# Patient Record
Sex: MALE | Race: BLACK OR AFRICAN AMERICAN | NOT HISPANIC OR LATINO | Employment: UNEMPLOYED | URBAN - METROPOLITAN AREA
[De-identification: names, ages, dates, MRNs, and addresses within clinical notes are randomized per-mention and may not be internally consistent; named-entity substitution may affect disease eponyms.]

---

## 2021-06-02 ENCOUNTER — RECORDS - HEALTHEAST (OUTPATIENT)
Dept: ADMINISTRATIVE | Facility: CLINIC | Age: 31
End: 2021-06-02

## 2023-01-17 ENCOUNTER — HOSPITAL ENCOUNTER (EMERGENCY)
Facility: CLINIC | Age: 33
Discharge: HOME | End: 2023-01-17
Attending: EMERGENCY MEDICINE | Admitting: EMERGENCY MEDICINE
Payer: COMMERCIAL

## 2023-01-17 ENCOUNTER — HOSPITAL ENCOUNTER (EMERGENCY)
Facility: HOSPITAL | Age: 33
Discharge: HOME | End: 2023-01-17
Attending: EMERGENCY MEDICINE
Payer: COMMERCIAL

## 2023-01-17 VITALS
TEMPERATURE: 98.8 F | SYSTOLIC BLOOD PRESSURE: 144 MMHG | BODY MASS INDEX: 22.49 KG/M2 | OXYGEN SATURATION: 99 % | WEIGHT: 135 LBS | RESPIRATION RATE: 18 BRPM | DIASTOLIC BLOOD PRESSURE: 99 MMHG | HEIGHT: 65 IN | HEART RATE: 88 BPM

## 2023-01-17 VITALS
HEART RATE: 73 BPM | HEIGHT: 65 IN | RESPIRATION RATE: 18 BRPM | DIASTOLIC BLOOD PRESSURE: 97 MMHG | WEIGHT: 134.6 LBS | BODY MASS INDEX: 22.42 KG/M2 | TEMPERATURE: 99.7 F | OXYGEN SATURATION: 100 % | SYSTOLIC BLOOD PRESSURE: 150 MMHG

## 2023-01-17 DIAGNOSIS — F12.90 CANNABINOID HYPEREMESIS SYNDROME: ICD-10-CM

## 2023-01-17 DIAGNOSIS — D72.829 LEUKOCYTOSIS, UNSPECIFIED TYPE: ICD-10-CM

## 2023-01-17 DIAGNOSIS — R11.2 CANNABINOID HYPEREMESIS SYNDROME: ICD-10-CM

## 2023-01-17 PROBLEM — R11.15 CYCLIC VOMITING SYNDROME: Status: ACTIVE | Noted: 2023-01-17

## 2023-01-17 LAB
ALBUMIN SERPL-MCNC: 4.4 G/DL (ref 3.5–5)
ALP SERPL-CCNC: 57 U/L (ref 45–120)
ALT SERPL W P-5'-P-CCNC: 27 U/L (ref 0–45)
ANION GAP SERPL CALCULATED.3IONS-SCNC: 14 MMOL/L (ref 5–18)
AST SERPL W P-5'-P-CCNC: 32 U/L (ref 0–40)
BASOPHILS # BLD AUTO: 0 10E3/UL (ref 0–0.2)
BASOPHILS NFR BLD AUTO: 0 %
BILIRUB SERPL-MCNC: 3.2 MG/DL (ref 0–1)
BUN SERPL-MCNC: 20 MG/DL (ref 8–22)
C REACTIVE PROTEIN LHE: 0.2 MG/DL (ref 0–?)
CALCIUM SERPL-MCNC: 9.8 MG/DL (ref 8.5–10.5)
CHLORIDE BLD-SCNC: 108 MMOL/L (ref 98–107)
CO2 SERPL-SCNC: 22 MMOL/L (ref 22–31)
CREAT SERPL-MCNC: 1.15 MG/DL (ref 0.7–1.3)
EOSINOPHIL # BLD AUTO: 0 10E3/UL (ref 0–0.7)
EOSINOPHIL NFR BLD AUTO: 0 %
ERYTHROCYTE [DISTWIDTH] IN BLOOD BY AUTOMATED COUNT: 12.7 % (ref 10–15)
GFR SERPL CREATININE-BSD FRML MDRD: 86 ML/MIN/1.73M2
GLUCOSE BLD-MCNC: 118 MG/DL (ref 70–125)
HCT VFR BLD AUTO: 46.4 % (ref 40–53)
HGB BLD-MCNC: 16.6 G/DL (ref 13.3–17.7)
IMM GRANULOCYTES # BLD: 0.1 10E3/UL
IMM GRANULOCYTES NFR BLD: 1 %
LIPASE SERPL-CCNC: <9 U/L (ref 0–52)
LYMPHOCYTES # BLD AUTO: 2.6 10E3/UL (ref 0.8–5.3)
LYMPHOCYTES NFR BLD AUTO: 20 %
MAGNESIUM SERPL-MCNC: 2.2 MG/DL (ref 1.8–2.6)
MCH RBC QN AUTO: 31.5 PG (ref 26.5–33)
MCHC RBC AUTO-ENTMCNC: 35.8 G/DL (ref 31.5–36.5)
MCV RBC AUTO: 88 FL (ref 78–100)
MONOCYTES # BLD AUTO: 1.8 10E3/UL (ref 0–1.3)
MONOCYTES NFR BLD AUTO: 14 %
NEUTROPHILS # BLD AUTO: 8.2 10E3/UL (ref 1.6–8.3)
NEUTROPHILS NFR BLD AUTO: 65 %
NRBC # BLD AUTO: 0 10E3/UL
NRBC BLD AUTO-RTO: 0 /100
PLATELET # BLD AUTO: 200 10E3/UL (ref 150–450)
POTASSIUM BLD-SCNC: 3.5 MMOL/L (ref 3.5–5)
PROT SERPL-MCNC: 7.6 G/DL (ref 6–8)
RBC # BLD AUTO: 5.27 10E6/UL (ref 4.4–5.9)
SODIUM SERPL-SCNC: 144 MMOL/L (ref 136–145)
WBC # BLD AUTO: 12.7 10E3/UL (ref 4–11)

## 2023-01-17 PROCEDURE — 36415 COLL VENOUS BLD VENIPUNCTURE: CPT | Performed by: EMERGENCY MEDICINE

## 2023-01-17 PROCEDURE — 83735 ASSAY OF MAGNESIUM: CPT | Performed by: EMERGENCY MEDICINE

## 2023-01-17 PROCEDURE — 250N000009 HC RX 250: Performed by: EMERGENCY MEDICINE

## 2023-01-17 PROCEDURE — 83690 ASSAY OF LIPASE: CPT | Performed by: EMERGENCY MEDICINE

## 2023-01-17 PROCEDURE — 86140 C-REACTIVE PROTEIN: CPT | Performed by: EMERGENCY MEDICINE

## 2023-01-17 PROCEDURE — 85025 COMPLETE CBC W/AUTO DIFF WBC: CPT | Performed by: EMERGENCY MEDICINE

## 2023-01-17 PROCEDURE — 96360 HYDRATION IV INFUSION INIT: CPT

## 2023-01-17 PROCEDURE — 250N000011 HC RX IP 250 OP 636: Performed by: EMERGENCY MEDICINE

## 2023-01-17 PROCEDURE — 99283 EMERGENCY DEPT VISIT LOW MDM: CPT | Mod: 25

## 2023-01-17 PROCEDURE — 250N000013 HC RX MED GY IP 250 OP 250 PS 637: Performed by: EMERGENCY MEDICINE

## 2023-01-17 PROCEDURE — 80053 COMPREHEN METABOLIC PANEL: CPT | Performed by: EMERGENCY MEDICINE

## 2023-01-17 PROCEDURE — 258N000003 HC RX IP 258 OP 636: Performed by: EMERGENCY MEDICINE

## 2023-01-17 PROCEDURE — 96361 HYDRATE IV INFUSION ADD-ON: CPT

## 2023-01-17 RX ORDER — SODIUM CHLORIDE 9 MG/ML
INJECTION, SOLUTION INTRAVENOUS CONTINUOUS
Status: DISCONTINUED | OUTPATIENT
Start: 2023-01-17 | End: 2023-01-17 | Stop reason: HOSPADM

## 2023-01-17 RX ORDER — DIPHENHYDRAMINE HYDROCHLORIDE 50 MG/ML
25 INJECTION INTRAMUSCULAR; INTRAVENOUS ONCE
Status: DISCONTINUED | OUTPATIENT
Start: 2023-01-17 | End: 2023-01-17 | Stop reason: HOSPADM

## 2023-01-17 RX ORDER — ONDANSETRON 4 MG/1
4 TABLET, ORALLY DISINTEGRATING ORAL ONCE
Status: COMPLETED | OUTPATIENT
Start: 2023-01-17 | End: 2023-01-17

## 2023-01-17 RX ORDER — ONDANSETRON 4 MG/1
4 TABLET, ORALLY DISINTEGRATING ORAL EVERY 6 HOURS PRN
Qty: 10 TABLET | Refills: 0 | Status: SHIPPED | OUTPATIENT
Start: 2023-01-17 | End: 2023-01-20

## 2023-01-17 RX ORDER — METOCLOPRAMIDE HYDROCHLORIDE 5 MG/ML
10 INJECTION INTRAMUSCULAR; INTRAVENOUS ONCE
Status: DISCONTINUED | OUTPATIENT
Start: 2023-01-17 | End: 2023-01-17 | Stop reason: HOSPADM

## 2023-01-17 RX ADMIN — ONDANSETRON 4 MG: 4 TABLET, ORALLY DISINTEGRATING ORAL at 06:52

## 2023-01-17 RX ADMIN — SODIUM CHLORIDE 1000 ML: 9 INJECTION, SOLUTION INTRAVENOUS at 07:06

## 2023-01-17 RX ADMIN — ALUMINUM HYDROXIDE, MAGNESIUM HYDROXIDE, AND DIMETHICONE 30 ML: 200; 20; 200 SUSPENSION ORAL at 07:35

## 2023-01-17 ASSESSMENT — ENCOUNTER SYMPTOMS
NAUSEA: 1
FEVER: 0
DIARRHEA: 0
VOMITING: 1
COUGH: 1
ABDOMINAL PAIN: 1

## 2023-01-17 ASSESSMENT — ACTIVITIES OF DAILY LIVING (ADL)
ADLS_ACUITY_SCORE: 35
ADLS_ACUITY_SCORE: 35

## 2023-01-17 NOTE — ED PROVIDER NOTES
Emergency Department Encounter      NAME: Oliver Dave  AGE: 32 year old male  YOB: 1990  MRN: [id number]  EVALUATION DATE & TIME: 2023  6:16 AM    PCP: No Ref-Primary, Physician    ED PROVIDER: Zion Green M.D.      Chief Complaint   Patient presents with     Nausea & Vomiting         FINAL IMPRESSION:  1. Cannabinoid hyperemesis syndrome    2. Leukocytosis, unspecified type        MEDICAL DECISION MAKIN:43 AM I met with the patient, obtained history, performed an initial exam, and discussed options and plan for diagnostics and treatment here in the ED.   7:16 AM  I rechecked and updated the patient. Nausea is better, but patient still has some burning epigastric discomfort. Requested GI cocktail. Nursing staff reported last marijuana use was three days ago.   8:58 AM I rechecked and updated the patient and discussed discharge.   9:07 AM I discharged the patient.     This patient is a 32-year-old male with a history of cannabinoid induced hyperemesis who presents with nausea and vomiting over the past 3 days.  He says that he has some burning epigastric discomfort.  He stopped smoking marijuana 3 days ago.  He has had this multiple times in the past and feels that this is the same as his previous episodes.  He tried using Dramamine, olanzapine and famotidine as well as melatonin for his symptoms but he has not had relief.  He has not had any fevers or chills.  No abdominal distention.  No diarrhea.  No infectious contacts.  On exam he was appearing quite uncomfortable.  He did get a good bit of relief from the initial Zofran ODT and wanted a GI cocktail as this is helped him in the past.  He was given a GI cocktail in the ER after his nausea had resolved.  He was given a prescription for Zofran 3 use at home.  We also discussed that he needs to stop using marijuana if he does not want to continue having problems like this.  His white blood cell count was slightly elevated but I  believe it was from the stress of the repeated vomiting.  His abdomen exam was benign prior to discharge.    Pertinent Labs & Imaging studies reviewed. (See chart for details)    The importance of close follow up was discussed. We reviewed warning signs and symptoms, and I instructed Mr. Dave to return to the emergency department immediately if he develops any new or worsening symptoms. I provided additional verbal discharge instructions. Mr. Dave expressed understanding and agreement with this plan of care, his questions were answered, and he was discharged in stable condition.     Medical Decision Making    History:    Supplemental history from: Documented in chart, if applicable    External Record(s) reviewed: Documented in chart, if applicable.    Work Up:    Chart documentation includes differential considered and any EKGs or imaging independently interpreted by provider, where specified.    In additional to work up documented, I considered the following work up: Documented in chart, if applicable.    External consultation:    Discussion of management with another provider: Documented in chart, if applicable    Complicating factors:    Care impacted by chronic illness: N/A    Care affected by social determinants of health: Alcohol Abuse and/or Recreational Drug Use    Disposition considerations: Discharge. I recommended the patient continue their current prescription strength medication(s): Olanzapine, famotidine and Zofran. See documentation for any additional details.         MEDICATIONS GIVEN IN THE EMERGENCY:  Medications   0.9% sodium chloride BOLUS (0 mLs Intravenous Stopped 1/17/23 0912)     Followed by   sodium chloride 0.9% infusion (has no administration in time range)   ondansetron (ZOFRAN ODT) ODT tab 4 mg (4 mg Oral Given 1/17/23 5463)   lidocaine (viscous) (XYLOCAINE) 2 % 15 mL, alum & mag hydroxide-simethicone (MAALOX) 15 mL GI Cocktail (30 mLs Oral Given 1/17/23 9104)       NEW  PRESCRIPTIONS STARTED AT TODAY'S ER VISIT:  Discharge Medication List as of 1/17/2023  9:12 AM             =================================================================    HPI    Patient information was obtained from: Patient and patient's     Use of : N/A       Per chart review, the patient was seen in [facility] Emergency Department on 1/17/2023 and left without being seen by provider.    Oliver Dave is a 32 year old male with a past medical history of cannabis use and cyclic vomiting syndrome who presents with nausea and vomiting.    For the past three days the patient as had nausea and vomiting. He was coughing at the time of the exam and had abdominal pain. Patient has been taking dramamine, olanzapine, and famotidine, and the last time they were taken was yesterday morning. He also took some melatonin. The last time he had symptoms like these, Zofran helped relieve his symptoms. He also requested a GI cocktail. He denies diarrhea and fever.    REVIEW OF SYSTEMS   Review of Systems   Constitutional: Negative for fever.   Respiratory: Positive for cough.    Gastrointestinal: Positive for abdominal pain, nausea and vomiting. Negative for diarrhea.   All other systems reviewed and are negative.       PAST MEDICAL HISTORY:  Past Medical History:   Diagnosis Date     Anemia      Anxiety      Cyclic vomiting syndrome 01/17/2023     Depression      Perianal abscess 11/23/2015       PAST SURGICAL HISTORY:  Past Surgical History:   Procedure Laterality Date     APPENDECTOMY         CURRENT MEDICATIONS:      Current Facility-Administered Medications:      [COMPLETED] 0.9% sodium chloride BOLUS, 1,000 mL, Intravenous, Once, Stopped at 01/17/23 0912 **FOLLOWED BY** sodium chloride 0.9% infusion, , Intravenous, Continuous, Zion Green MD    Current Outpatient Medications:      ondansetron (ZOFRAN ODT) 4 MG ODT tab, Take 1 tablet (4 mg) by mouth every 6 hours as needed for nausea, Disp: 10  "tablet, Rfl: 0     codeine-guaiFENesin (GUAIFENESIN AC)  mg/5 mL liquid, [CODEINE-GUAIFENESIN (GUAIFENESIN AC)  MG/5 ML LIQUID] Take 10 mL by mouth 3 (three) times a day as needed for cough., Disp: 240 mL, Rfl: 0     hydrOXYzine (ATARAX) 25 MG tablet, [HYDROXYZINE (ATARAX) 25 MG TABLET] 1 tablet every 6-8 hours as needed for anxiety., Disp: 12 tablet, Rfl: 0     metoclopramide (REGLAN) 5 mg/5 mL solution, [METOCLOPRAMIDE (REGLAN) 5 MG/5 ML SOLUTION] Take 10 mL (10 mg total) by mouth 3 (three) times a day as needed for nausea. Use after zofran, Disp: 200 mL, Rfl: 0     naproxen (NAPROSYN) 375 MG tablet, [NAPROXEN (NAPROSYN) 375 MG TABLET] Take 1 tablet (375 mg total) by mouth 2 (two) times a day with meals., Disp: 20 tablet, Rfl: 0     potassium chloride (K-DUR,KLOR-CON) 10 MEQ tablet, [POTASSIUM CHLORIDE (K-DUR,KLOR-CON) 10 MEQ TABLET] Take 1 tablet (10 mEq total) by mouth 2 (two) times a day., Disp: 10 tablet, Rfl: 0    ALLERGIES:  Allergies   Allergen Reactions     Iodine Unknown       FAMILY HISTORY:  History reviewed. No pertinent family history.    SOCIAL HISTORY:   Social History     Socioeconomic History     Marital status: Single   Tobacco Use     Smoking status: Never     Smokeless tobacco: Never   Substance and Sexual Activity     Alcohol use: Yes     Comment: Alcoholic Drinks/day: Socially      Drug use: No     Sexual activity: Yes     Partners: Female       PHYSICAL EXAM:    Vitals: BP (!) 150/97   Pulse 73   Temp 99.7  F (37.6  C) (Temporal)   Resp 18   Ht 1.651 m (5' 5\")   Wt 61.1 kg (134 lb 9.6 oz)   SpO2 100%   BMI 22.40 kg/m     Constitutional: Well developed, well nourished. Alert black male, restless and uncomfortable appearing.  HEAD:Normocephalic, atraumatic,   Eyes: PERRLA, EOM intact, conjunctiva clear, no discharge  ENT: tacky mucous membranes, nose normal.   Neck- Supple, gross ROM intact.  No JVD.  No palpable nodes.  Pulmonary: Clear to auscultation bilaterally, no " respiratory distress, no wheezing, speaks full sentences easily.  Chest: No chest wall tenderness  Cardiovascular: Normal heart rate, regular rhythm, no murmurs. No lower extremity edema, 2+ DP pulses.   GI: Soft, no tenderness to deep palpation in all quadrants, not distended, no masses.  No hepatosplenomegaly.  Musculoskeletal: Moving all 4 extremities intentionally and without pain. No obvious deformity.  Back: No CVA tenderness  Skin: Warm, dry, no rash.  Neurologic: Alert & oriented x 3, speech clear, moving all extremities spontaneously   Psychiatric: Affect normal, cooperative.     LAB:  All pertinent labs reviewed and interpreted.  Labs Ordered and Resulted from Time of ED Arrival to Time of ED Departure   COMPREHENSIVE METABOLIC PANEL - Abnormal       Result Value    Sodium 144      Potassium 3.5      Chloride 108 (*)     Carbon Dioxide (CO2) 22      Anion Gap 14      Urea Nitrogen 20      Creatinine 1.15      Calcium 9.8      Glucose 118      Alkaline Phosphatase 57      AST 32      ALT 27      Protein Total 7.6      Albumin 4.4      Bilirubin Total 3.2 (*)     GFR Estimate 86     CBC WITH PLATELETS AND DIFFERENTIAL - Abnormal    WBC Count 12.7 (*)     RBC Count 5.27      Hemoglobin 16.6      Hematocrit 46.4      MCV 88      MCH 31.5      MCHC 35.8      RDW 12.7      Platelet Count 200      % Neutrophils 65      % Lymphocytes 20      % Monocytes 14      % Eosinophils 0      % Basophils 0      % Immature Granulocytes 1      NRBCs per 100 WBC 0      Absolute Neutrophils 8.2      Absolute Lymphocytes 2.6      Absolute Monocytes 1.8 (*)     Absolute Eosinophils 0.0      Absolute Basophils 0.0      Absolute Immature Granulocytes 0.1      Absolute NRBCs 0.0     LIPASE - Normal    Lipase <9     CRP INFLAMMATION - Normal    CRP 0.2     MAGNESIUM - Normal    Magnesium 2.2         Maxi VILLELA, am serving as a scribe to document services personally performed by Dr. Zion Green based on my observation and the  provider's statements to me. I, Zion Green M.D. attest that Maxi WarnerGlover is acting in a scribe capacity, has observed my performance of the services and has documented them in accordance with my direction.      Zion Green M.D.  Emergency Medicine  [facility] EMERGENCY ROOM  [address] [facility] [address]  [phone]  Dept: [phone]     Zion Green MD  02/01/23 0138

## 2023-01-17 NOTE — ED PROVIDER NOTES
6:19 AM.  I had signed up for the patient and reviewed the chart in the computer and went to go see the patient.  Informed by nursing that the patient had just left without being seen after triage.    Diagnostic impression:    Nausea and vomiting, history of cyclic vomiting syndrome.     Rye Jean MD  01/17/23 0646

## 2023-01-17 NOTE — Clinical Note
Oliver Dave was seen and treated in our emergency department on 1/17/2023.  He may return to work on 01/19/2023.       If you have any questions or concerns, please don't hesitate to call.      Zion Green MD

## 2023-01-17 NOTE — ED TRIAGE NOTES
"Pt arrives to triage ambulatory w/ girlfriend endorsing hyperemesis cannaboid syndrome, three days constant nausea/vomiting and abdominal pain,     pt has been taking olanzapine yesterday before bed for mental health, 0600 yesterday took dramamine and famiditine helped \"barely\"    pt coughing in triage \"past three days\" and this happens with this per pt  "

## 2023-01-17 NOTE — ED TRIAGE NOTES
Reports history of CHS, now having nausea and vomiting   Reports consuming cannabis about 3 days ago      Triage Assessment     Row Name 01/17/23 0626       Triage Assessment (Adult)    Airway WDL WDL       Respiratory WDL    Respiratory WDL WDL       Skin Circulation/Temperature WDL    Skin Circulation/Temperature WDL WDL       Cardiac WDL    Cardiac WDL WDL       Peripheral/Neurovascular WDL    Peripheral Neurovascular WDL WDL       Cognitive/Neuro/Behavioral WDL    Cognitive/Neuro/Behavioral WDL WDL

## 2023-05-13 ENCOUNTER — HOSPITAL ENCOUNTER (EMERGENCY)
Facility: CLINIC | Age: 33
Discharge: HOME | End: 2023-05-13
Attending: STUDENT IN AN ORGANIZED HEALTH CARE EDUCATION/TRAINING PROGRAM | Admitting: STUDENT IN AN ORGANIZED HEALTH CARE EDUCATION/TRAINING PROGRAM
Payer: COMMERCIAL

## 2023-05-13 VITALS
RESPIRATION RATE: 18 BRPM | DIASTOLIC BLOOD PRESSURE: 98 MMHG | SYSTOLIC BLOOD PRESSURE: 137 MMHG | BODY MASS INDEX: 23.46 KG/M2 | WEIGHT: 146 LBS | OXYGEN SATURATION: 95 % | HEIGHT: 66 IN | HEART RATE: 79 BPM | TEMPERATURE: 98.8 F

## 2023-05-13 DIAGNOSIS — R11.2 CANNABINOID HYPEREMESIS SYNDROME: ICD-10-CM

## 2023-05-13 DIAGNOSIS — F12.90 CANNABINOID HYPEREMESIS SYNDROME: ICD-10-CM

## 2023-05-13 PROBLEM — G47.9 SLEEP DISTURBANCE: Status: ACTIVE | Noted: 2020-09-01

## 2023-05-13 PROBLEM — F31.62 BIPOLAR DISORDER, CURRENT EPISODE MIXED, MODERATE (H): Status: ACTIVE | Noted: 2020-10-30

## 2023-05-13 PROBLEM — J45.20 MILD INTERMITTENT ASTHMA: Status: ACTIVE | Noted: 2020-09-01

## 2023-05-13 PROBLEM — F41.0 GENERALIZED ANXIETY DISORDER WITH PANIC ATTACKS: Status: ACTIVE | Noted: 2021-01-29

## 2023-05-13 PROBLEM — F33.1 MAJOR DEPRESSIVE DISORDER, RECURRENT, MODERATE (H): Status: ACTIVE | Noted: 2021-01-29

## 2023-05-13 PROBLEM — F43.25 ADJUSTMENT DISORDER WITH MIXED DISTURBANCE OF EMOTIONS AND CONDUCT: Status: ACTIVE | Noted: 2020-09-17

## 2023-05-13 PROBLEM — F39 EPISODIC MOOD DISORDER (H): Status: ACTIVE | Noted: 2020-09-01

## 2023-05-13 PROBLEM — F41.1 GENERALIZED ANXIETY DISORDER WITH PANIC ATTACKS: Status: ACTIVE | Noted: 2021-01-29

## 2023-05-13 PROBLEM — J31.0 CHRONIC RHINITIS: Status: ACTIVE | Noted: 2020-09-01

## 2023-05-13 PROBLEM — Z91.013 SHELLFISH ALLERGY: Status: ACTIVE | Noted: 2020-09-01

## 2023-05-13 PROCEDURE — 250N000009 HC RX 250: Performed by: STUDENT IN AN ORGANIZED HEALTH CARE EDUCATION/TRAINING PROGRAM

## 2023-05-13 PROCEDURE — 250N000011 HC RX IP 250 OP 636: Performed by: STUDENT IN AN ORGANIZED HEALTH CARE EDUCATION/TRAINING PROGRAM

## 2023-05-13 PROCEDURE — 250N000013 HC RX MED GY IP 250 OP 250 PS 637: Performed by: STUDENT IN AN ORGANIZED HEALTH CARE EDUCATION/TRAINING PROGRAM

## 2023-05-13 PROCEDURE — 99284 EMERGENCY DEPT VISIT MOD MDM: CPT

## 2023-05-13 RX ORDER — ONDANSETRON 4 MG/1
4 TABLET, ORALLY DISINTEGRATING ORAL EVERY 8 HOURS PRN
Qty: 10 TABLET | Refills: 0 | Status: SHIPPED | OUTPATIENT
Start: 2023-05-13

## 2023-05-13 RX ORDER — OLANZAPINE 5 MG/1
5 TABLET ORAL
Qty: 8 TABLET | Refills: 0 | Status: SHIPPED | OUTPATIENT
Start: 2023-05-13

## 2023-05-13 RX ORDER — ONDANSETRON 4 MG/1
4 TABLET, ORALLY DISINTEGRATING ORAL ONCE
Status: COMPLETED | OUTPATIENT
Start: 2023-05-13 | End: 2023-05-13

## 2023-05-13 RX ADMIN — ALUMINUM HYDROXIDE, MAGNESIUM HYDROXIDE, AND DIMETHICONE 30 ML: 200; 20; 200 SUSPENSION ORAL at 08:08

## 2023-05-13 RX ADMIN — ONDANSETRON 4 MG: 4 TABLET, ORALLY DISINTEGRATING ORAL at 08:15

## 2023-05-13 RX ADMIN — OLANZAPINE 5 MG: 5 TABLET, ORALLY DISINTEGRATING ORAL at 08:14

## 2023-05-13 NOTE — ED TRIAGE NOTES
Patient reports cannabinoid hyperemesis history. Stopped smoking x 1 year but recently smoked and has recurrence of symptoms- nausea, vomiting, abdominal pain, cough. Symptoms feel same as previous episodes, he is requesting Zofran and GI cocktail.      Triage Assessment     Row Name 05/13/23 0749       Triage Assessment (Adult)    Airway WDL WDL       Respiratory WDL    Respiratory WDL X  cough, sneezing       Skin Circulation/Temperature WDL    Skin Circulation/Temperature WDL WDL       Cardiac WDL    Cardiac WDL WDL       Peripheral/Neurovascular WDL    Peripheral Neurovascular WDL WDL       Cognitive/Neuro/Behavioral WDL    Cognitive/Neuro/Behavioral WDL WDL

## 2023-05-13 NOTE — ED PROVIDER NOTES
Emergency Department Encounter         FINAL IMPRESSION:  Vomiting, abdominal pain        ED COURSE AND MEDICAL DECISION MAKING            32-year-old history of cannabinoid hyperemesis syndrome, here with concerns of flare of his similar presentations.  States he smoked in the last couple days.  Has had increasing generalized abdominal discomfort as well as nausea and vomiting which he states is similar to his previous episodes.  No fevers.  Status post appendectomy.  No dysuria or bowel movement changes.    Patient requesting GI cocktail and Zofran.  Offered Zyprexa as well which patient was open to.    Examination otherwise unremarkable.  He has no abdominal pain, normal heart and lungs.    Patient was given medications with improvement of his symptoms as well as prescription for Zyprexa and Zofran.           7:55 AM I met with the patient to gather history and to perform my initial exam. We discussed plans for the ED course, including diagnostic testing and treatment.          Medical Decision Making    History:    Supplemental history from: Documented in chart, if applicable    External Record(s) reviewed: Documented in chart, if applicable.    Work Up:    Chart documentation includes differential considered and any EKGs or imaging independently interpreted by provider, where specified.    In additional to work up documented, I considered the following work up: Documented in chart, if applicable.    External consultation:    Discussion of management with another provider: Documented in chart, if applicable    Complicating factors:    Care impacted by chronic illness: N/A    Care affected by social determinants of health: N/A    Disposition considerations: Discharge. I prescribed additional prescription strength medication(s) as charted. See documentation for any additional details.              Critical Care     Performed by: Humberto Gunter or    Authorized by: Humberto Gunter  Total critical care time:  minutes  Critical  care was necessary to treat or prevent imminent or life-threatening deterioration of the following conditions:   Critical care was time spent personally by me on the following activities: development of treatment plan with patient or surrogate, discussions with consultants, examination of patient, evaluation of patient's response to treatment, obtaining history from patient or surrogate, ordering and performing treatments and interventions, ordering and review of laboratory studies, ordering and review of radiographic studies, re-evaluation of patient's condition and monitoring for potential decompensation.  Critical care time was exclusive of separately billable procedures and treating other patients.'    At the conclusion of the encounter I discussed the results of all the tests and the disposition. The questions were answered. The patient or family acknowledged understanding and was agreeable with the care plan.                  MEDICATIONS GIVEN IN THE EMERGENCY DEPARTMENT:  Medications   ondansetron (ZOFRAN ODT) ODT tab 4 mg (4 mg Oral $Given 5/13/23 0815)   OLANZapine zydis (zyPREXA) ODT half-tab 5 mg (5 mg Oral $Given 5/13/23 0814)   lidocaine (viscous) (XYLOCAINE) 2 % 15 mL, alum & mag hydroxide-simethicone (MAALOX) 15 mL GI Cocktail (30 mLs Oral $Given 5/13/23 0808)       NEW PRESCRIPTIONS STARTED AT TODAY'S ED VISIT:  New Prescriptions    OLANZAPINE (ZYPREXA) 5 MG TABLET    Take 1 tablet (5 mg) by mouth nightly as needed (nausea, abdominal pain,)    ONDANSETRON (ZOFRAN ODT) 4 MG ODT TAB    Take 1 tablet (4 mg) by mouth every 8 hours as needed for nausea or vomiting       HPI     Patient information obtained from: patient    Use of Interpretor: N/A    Oliver Dave is a 32 year old male with a pertinent history of cyclic vomiting syndrome, anemia, appendectomy, bipolar disorder, anxiety, and depression who presents to this ED via walk in by self for evaluation of nausea, vomiting, abdominal pain, and  "cough.    Per chart review, patient presented to [facility] ED on 1/17/2023 for evaluation of nausea and vomiting for the past 3 days. History of cannabinoid induced hyperemesis. Had some burning epigastric discomfort. Stated he stopped smoking marijuana 3 days ago. Has had this multiple times in the past and felt the same as previous episodes. Had initial relief from Zofran ODT. Was given a GI cocktail. Was given a prescription of Zofran. WBC slightly elevated, but believed to be due to repeated vomiting. Discharged in stable condition.    Patient presents with nausea, vomiting, abdominal pain, coughing, and sneezing that are related to his cannabinoid hyperemesis syndrome (CHS). He reports his current symptoms feel similar to past cannabinoid hyperemesis syndrome flare ups, but this time, it's the \"only time [he's] decent.\"  His flare ups are \"usually super bad.\" He reports he hasn't smoked marijuana in a while. He is requesting a GI cocktail and Zofran, which have provided relief in the past.     Denies fever or chest pain. Patient does not report of any other medical concerns or complaints at this time.     REVIEW OF SYSTEMS:  Review of Systems   Constitutional: Negative for fever, malaise  HEENT: Negative runny nose, sore throat, ear pain, neck pain  Respiratory: Negative for shortness of breath, congestion. Positive for cough and sneezing.  Cardiovascular: Negative for chest pain, leg edema  Gastrointestinal: Negative for abdominal distention, constipation, diarrhea. Positive for abdominal pain, nausea, and vomiting.   Genitourinary: Negative for dysuria and hematuria.   Integument: Negative for rash, skin breakdown  Neurological: Negative for paresthesias, weakness, headache.  Musculoskeletal: Negative for joint pain, joint swelling    All other systems reviewed and are negative.    MEDICAL HISTORY     Past Medical History:   Diagnosis Date     Anemia      Anxiety      Cyclic vomiting syndrome 01/17/2023     " "Depression      Perianal abscess 11/23/2015       Past Surgical History:   Procedure Laterality Date     APPENDECTOMY         Social History     Tobacco Use     Smoking status: Never     Smokeless tobacco: Never   Substance Use Topics     Alcohol use: Yes     Comment: Alcoholic Drinks/day: Socially      Drug use: No       OLANZapine (ZYPREXA) 5 MG tablet  ondansetron (ZOFRAN ODT) 4 MG ODT tab  codeine-guaiFENesin (GUAIFENESIN AC)  mg/5 mL liquid  hydrOXYzine (ATARAX) 25 MG tablet  metoclopramide (REGLAN) 5 mg/5 mL solution  naproxen (NAPROSYN) 375 MG tablet  potassium chloride (K-DUR,KLOR-CON) 10 MEQ tablet            PHYSICAL EXAM     BP (!) 137/98   Pulse 79   Temp 98.8  F (37.1  C) (Temporal)   Resp 18   Ht 1.676 m (5' 6\")   Wt 66.2 kg (146 lb)   SpO2 95%   BMI 23.57 kg/m        PHYSICAL EXAM:     General: Patient appears well, nontoxic, comfortable  HEENT: Moist mucous membranes,  No head trauma.    Cardiovascular: Normal rate, normal rhythm, no extremity edema.  No appreciable murmur.  Respiratory: No signs of respiratory distress, lungs are clear to auscultation bilaterally with no wheezes rhonchi or rales.  Abdominal: Soft, nontender, nondistended, no palpable masses, no guarding, no rebound  Musculoskeletal: Full range of motion of joints, no deformities appreciated.  Neurological: Alert and oriented, grossly neurologically intact.  Psychological: Normal affect and mood.  Integument: No rashes appreciated          RESULTS       Labs Ordered and Resulted from Time of ED Arrival to Time of ED Departure - No data to display    No orders to display         PROCEDURES:  Procedures:  Procedures       I, Floyd Sanchez am serving as a scribe to document services personally performed by Humberto Gunter DO, based on my observations and the provider's statements to me.  IHumberto DO, attest that Floyd Sanchez is acting in a scribe capacity, has observed my performance of the services and has " documented them in accordance with my direction.    Humberto Gunter DO  Emergency Medicine  [facility] EMERGENCY ROOM     Ohl, Humberto Kimble DO  05/13/23 1015

## 2023-05-13 NOTE — Clinical Note
Oliver Dave was seen and treated in our emergency department on 5/13/2023.  He may return to work on 05/15/2023.       If you have any questions or concerns, please don't hesitate to call.      Ohl, Humberto Kimble, DO

## 2023-05-13 NOTE — DISCHARGE INSTRUCTIONS
Push oral hydration at home including Pedialyte for electrolytes.  Your abdominal pain and nausea should get better over the next day or so.    Take medications as prescribed.  Return for any worsening abdominal pain.